# Patient Record
(demographics unavailable — no encounter records)

---

## 2025-03-03 NOTE — HISTORY OF PRESENT ILLNESS
[FreeTextEntry1] : Patient is a 77 year old M with primary medical history of HTN who presents for LUTS.  HPI: He reports obstructive low urinary tract symptoms for a few years, mainly urinary frequency, incomplete empty sensation, post void dribbling, nocturia x 2. FOS good, no hesitancy, straining. He has taken tamsulosin for one week, some improvement, no HA/dizziness. He has taken BPH medication in [place] for one year which helps with spraying. No recurrent UTI or urinary retentions. No urological procedures.  Reviewed labs from PCP on 9/21/23 PSA 2.16, Cr 0.83, urine test negative for infection, RBC 0/HPF. No family history of  malignancy. SmokeS 1ppd x 40 years, no ETOH.  He also reports ED - he is interested in sex and trial of viagra. He is able to obtain erections but states the moment he tries to penetrate he loses his erections.  Interval hx: He was given finasteride and tolerated well. He has been taking both tamsulosin and finasteride - feels FOS improved, but seems to have worsened nocturia. Now nocturia x4. DTF also appears to have worsened. He denied any urinary tract infections or urinary retention since last visit. No gross hematuria, dysuria, or fever/chills. Denies glaucoma.  He previously took sildenafil 40 mg, which helped a little. He feels it is not that helpful but willing to try higher dose.  7/29/2024 Patient was given oxybutynin for worsening nocturia, reports decreased nocturia x 1 after taking oxybutynin. Tolerates well, denied dry eyes/mouth. He does have chronic constipation. DTF q2-3 hours, feels emptying well. He remains on tamsulosin and finasteride.  He was counselled to increase sildenafil to 60 -80 mg on demand in previous visit. He has not tried yet. Denies chest pain or nitrate use.  3/3/25 Patient reports worsening LUTS for the past 6 months, reports urinary spraying, nocturia x 4, increased urinary frequency, urgency, post void dribbling, incomplete emptying sensation. He is not able to tolerate oxybutynin and has stopped the medication for a couple of months. His LUTS worsened/reverted afterwards. No dysuria, hematuria or difficulty of urination.   PSA 1.0 in 5/2024.

## 2025-03-03 NOTE — ASSESSMENT
[FreeTextEntry1] : Patient is a 78 yo M who presents for BPH/LUTs and ED.  -LUTS improved after adding finasteride initially, but now feels LUTS have reverted after stopping oxybutynin which initially helped but could not continue.  -he is on combo therapy with tamsulosin and finasteride.  -he is inquiring about BPH surgery -D/w pt BPH surgeries - including MIST, TURP, laser vaporization, enucleation ie HoLEP, SPP (open or robotic) -- d/w pt pros/cons-D/w pt that will need to first assess prostate volume/shape with cysto and ULT -R/B/A reviewed, including risks of bleeding, infection, anejaculation, sexual dysfunction, incontinence, stricture  pvr 9cc  -in the meantime, d/w pt trial of newer ACh with poss lower SE profile - he is willing.  Rx for solifenacin sent  -ED: not responsive with sildenafil 40 mg. Dose increase to 60-80mg.  He has not taken higher dose and is no longer very interested.  -f/u for cysto, ULT  He will require longitudinal care for his chronic/complex urologic conditions.

## 2025-03-03 NOTE — PHYSICAL EXAM
[General Appearance - Well Developed] : well developed [Normal Appearance] : normal appearance [General Appearance - In No Acute Distress] : no acute distress [Urinary Bladder Findings] : the bladder was normal on palpation [Normal Station and Gait] : the gait and station were normal for the patient's age

## 2025-05-05 NOTE — ASSESSMENT
[FreeTextEntry1] : Patient is a 78 yo M who presents for BPH/LUTs and ED.  -LUTS improved after adding finasteride initially, but now feels LUTS have reverted after stopping oxybutynin which initially helped but could not continue.  -he is on combo therapy with tamsulosin and finasteride.  -he still vesicare as well - no change/improvement -still bothered by LUTS -his recent cysto and prostate ULT were grossly unremarkable - 21cc prostate gland.  Discussed with pt trial of mirabegron and side effect profile including increasing BP.  He will check BP at home and f/u in 2 weeks for BP check and symptom check.  F/u within few wks  He will require longitudinal care for his chronic/complex urologic conditions.

## 2025-05-05 NOTE — PHYSICAL EXAM
[General Appearance - Well Developed] : well developed [Normal Appearance] : normal appearance [General Appearance - In No Acute Distress] : no acute distress [Urinary Bladder Findings] : the bladder was normal on palpation

## 2025-05-05 NOTE — HISTORY OF PRESENT ILLNESS
[FreeTextEntry1] : Patient is a 77 year old M with primary medical history of HTN who presents for LUTS.  HPI: He reports obstructive low urinary tract symptoms for a few years, mainly urinary frequency, incomplete empty sensation, post void dribbling, nocturia x 2. FOS good, no hesitancy, straining. He has taken tamsulosin for one week, some improvement, no HA/dizziness. He has taken BPH medication in [place] for one year which helps with spraying. No recurrent UTI or urinary retentions. No urological procedures.  Reviewed labs from PCP on 9/21/23 PSA 2.16, Cr 0.83, urine test negative for infection, RBC 0/HPF. No family history of  malignancy. SmokeS 1ppd x 40 years, no ETOH.  He also reports ED - he is interested in sex and trial of viagra. He is able to obtain erections but states the moment he tries to penetrate he loses his erections.  Interval hx: He was given finasteride and tolerated well. He has been taking both tamsulosin and finasteride - feels FOS improved, but seems to have worsened nocturia. Now nocturia x4. DTF also appears to have worsened. He denied any urinary tract infections or urinary retention since last visit. No gross hematuria, dysuria, or fever/chills. Denies glaucoma. He previously took sildenafil 40 mg, which helped a little. He feels it is not that helpful but willing to try higher dose.  7/29/2024 Patient was given oxybutynin for worsening nocturia, reports decreased nocturia x 1 after taking oxybutynin. Tolerates well, denied dry eyes/mouth. He does have chronic constipation. DTF q2-3 hours, feels emptying well. He remains on tamsulosin and finasteride. He was counselled to increase sildenafil to 60 -80 mg on demand in previous visit. He has not tried yet. Denies chest pain or nitrate use.  3/3/25 Patient reports worsening LUTS for the past 6 months, reports urinary spraying, nocturia x 4, increased urinary frequency, urgency, post void dribbling, incomplete emptying sensation. He is not able to tolerate oxybutynin and has stopped the medication for a couple of months. His LUTS worsened/reverted afterwards. No dysuria, hematuria or difficulty of urination. PSA 1.0 in 5/2024.  5/5/25 Patient underwent cystoscopy and renal/bladder/prostate sonogram noted unremarkable prostate, 21 cc. He was started on vesicare in addition of finasteride and tamsulosin.  Reports no significant changes/improvement on vesicare.  Still with urinary hesitancy in the first morning voiding, post void dribbling, nocturia x2-3, incomplete emptying sensation. No incontinence.  No dysuria or hematuria, suprapubic pain or flank pain.

## 2025-06-09 NOTE — PHYSICAL EXAM
[General Appearance - Well Developed] : well developed [General Appearance - In No Acute Distress] : no acute distress [Normal Appearance] : normal appearance [Urinary Bladder Findings] : the bladder was normal on palpation [Normal Station and Gait] : the gait and station were normal for the patient's age [No Focal Deficits] : no focal deficits

## 2025-06-09 NOTE — HISTORY OF PRESENT ILLNESS
[FreeTextEntry1] : Patient is a 77 year old M with primary medical history of HTN who presents for LUTS.  HPI: He reports obstructive low urinary tract symptoms for a few years, mainly urinary frequency, incomplete empty sensation, post void dribbling, nocturia x 2. FOS good, no hesitancy, straining. He has taken tamsulosin for one week, some improvement, no HA/dizziness. He has taken BPH medication in [place] for one year which helps with spraying. No recurrent UTI or urinary retentions. No urological procedures.  Reviewed labs from PCP on 9/21/23 PSA 2.16, Cr 0.83, urine test negative for infection, RBC 0/HPF. No family history of  malignancy. SmokeS 1ppd x 40 years, no ETOH.  He also reports ED - he is interested in sex and trial of viagra. He is able to obtain erections but states the moment he tries to penetrate he loses his erections.  Interval hx: He was given finasteride and tolerated well. He has been taking both tamsulosin and finasteride - feels FOS improved, but seems to have worsened nocturia. Now nocturia x4. DTF also appears to have worsened. He denied any urinary tract infections or urinary retention since last visit. No gross hematuria, dysuria, or fever/chills. Denies glaucoma. He previously took sildenafil 40 mg, which helped a little. He feels it is not that helpful but willing to try higher dose.  7/29/2024 Patient was given oxybutynin for worsening nocturia, reports decreased nocturia x 1 after taking oxybutynin. Tolerates well, denied dry eyes/mouth. He does have chronic constipation. DTF q2-3 hours, feels emptying well. He remains on tamsulosin and finasteride. He was counselled to increase sildenafil to 60 -80 mg on demand in previous visit. He has not tried yet. Denies chest pain or nitrate use.  3/3/25 Patient reports worsening LUTS for the past 6 months, reports urinary spraying, nocturia x 4, increased urinary frequency, urgency, post void dribbling, incomplete emptying sensation. He is not able to tolerate oxybutynin and has stopped the medication for a couple of months. His LUTS worsened/reverted afterwards. No dysuria, hematuria or difficulty of urination. PSA 1.0 in 5/2024.  5/5/25 Patient underwent cystoscopy and renal/bladder/prostate sonogram noted unremarkable prostate, 21 cc. He was started on vesicare in addition of finasteride and tamsulosin.  Reports no significant changes/improvement on vesicare.  Still with urinary hesitancy in the first morning voiding, post void dribbling, nocturia x2-3, incomplete emptying sensation. No incontinence.  No dysuria or hematuria, suprapubic pain or flank pain.   6/9/25 Patient was given Myrbetriq and noticed no significant change of LUTS. He recently self increased tamsulosin to 0.8mg and reports improved FOS, stronger stream, nocturia x 1-2. Sensation of emptying better on flomax 0.8mg.  No LH or BP changes.

## 2025-06-09 NOTE — ASSESSMENT
[FreeTextEntry1] : Patient is a 76 yo M who presents for BPH/LUTs and ED.  LUTS - no sig improvement with Mirabegron or vesicare. He has increased tamsulosin 0.8 mg with improvement of LUTS.  Recent cysto and prostate ULT were grossly unremarkable - 21cc prostate gland. He was tolerating 0.8 flomax well, requests rx.  Rx sent.  Reviewed poss SE - [place] LH. PVR today 0cc.   F/u 6 mos  He will require longitudinal care for his chronic/complex urologic conditions.